# Patient Record
Sex: FEMALE | URBAN - METROPOLITAN AREA
[De-identification: names, ages, dates, MRNs, and addresses within clinical notes are randomized per-mention and may not be internally consistent; named-entity substitution may affect disease eponyms.]

---

## 2020-06-24 ENCOUNTER — OFFICE VISIT (OUTPATIENT)
Dept: PRIMARY CARE CLINIC | Age: 20
End: 2020-06-24

## 2020-06-24 VITALS — OXYGEN SATURATION: 99 % | TEMPERATURE: 98.8 F | HEART RATE: 91 BPM

## 2020-06-24 PROCEDURE — 99212 OFFICE O/P EST SF 10 MIN: CPT | Performed by: NURSE PRACTITIONER

## 2020-06-24 NOTE — PROGRESS NOTES
vaccine  Aged Out    Hib vaccine  Aged Out    Meningococcal (ACWY) vaccine  Aged Out    Pneumococcal 0-64 years Vaccine  Aged Out       VITALS:  Vitals:    06/24/20 1318   Pulse: 91   Temp: 98.8 °F (37.1 °C)   SpO2: 99%       Physical Exam:  [x]Alert  [x]No apparent distress   [x]Breathing appears normal   [x]Patient can speak in full sentences    []Ill appearing   []Lips are cyanotic   []Appears tachypneic     [x]Oriented to person/place/time     TESTS ORDERED:  [] POC Flu ordered  [] POC Strep ordered  [x] COVID 19 ordered    RESULTS:  No results found for this visit on 06/24/20. ASSESSMENT/PLAN:  Shakir Marte was seen today for concern for covid-19. Diagnoses and all orders for this visit:    Close exposure to COVID-19 virus  -     COVID-19 Ambulatory; Future  -     COVID-19 Ambulatory        Patient instructed to call PCP if symptoms worsen or fail to improve, and to go to the ED if develops red flags (these symptoms were reviewed with patient). Patient informed can also return to this site for reassessment of related symptoms. Patient expressed understanding. Discharge home with written instructions for:  [] Flu management including medication treatment if qualifies  [x] CDC guildelines for self-quarantine in an asymptomatic patient with COVID-19 exposure   (2 weeks if no symptoms. If symptoms develop then patient follows isolation guidelines below.)  [] CDC guidelines for isolation in symptomatic patient with assumed COVID-19;        TO END ISOLATION: (Patient understands these guidelines are subject to change)  1. No fever for at least 72 hours without medications AND  2. Symptoms have resolved AND  3. At least 10 days from initial symptom onset    Patient understands that a hands-on exam could not be completed during this high risk assessment due to the COVID-19 pandemic. This note will be routed to the patient's PCP to inform of this limited assessment.     Written by Carrie Mendez MA acting as a

## 2020-06-26 LAB
SARS-COV-2: NOT DETECTED
SOURCE: NORMAL

## 2020-06-29 NOTE — RESULT ENCOUNTER NOTE
Please contact patient with their testing results: Your test for COVID-19, also known as novel coronavirus, came back negative. No virus was detected from the sample collected. Testing is not 100%. Until your symptoms are fully resolved, you may still be contagious. We recommend that you remain isolated for 7 days minimum or 72 hours after your symptoms have completely resolved, whichever is longer. If you were exposed to a known positive COIVID-19 patient, then you must remain isolated for 14 days. If you were tested for a pre-op, then you remain in isolated until your procedure. Continually monitor symptoms. Contact a medical provider if symptoms are worsening. If you have any additional questions, contact your PCP.     For additional information, please visit the Centers for Disease Control and Prevention [email or web address]